# Patient Record
Sex: MALE | Race: WHITE
[De-identification: names, ages, dates, MRNs, and addresses within clinical notes are randomized per-mention and may not be internally consistent; named-entity substitution may affect disease eponyms.]

---

## 2018-01-23 ENCOUNTER — HOSPITAL ENCOUNTER (INPATIENT)
Dept: HOSPITAL 17 - PHED | Age: 63
LOS: 2 days | Discharge: HOME | DRG: 193 | End: 2018-01-25
Attending: HOSPITALIST | Admitting: HOSPITALIST
Payer: OTHER GOVERNMENT

## 2018-01-23 VITALS
RESPIRATION RATE: 16 BRPM | SYSTOLIC BLOOD PRESSURE: 97 MMHG | HEART RATE: 72 BPM | OXYGEN SATURATION: 97 % | DIASTOLIC BLOOD PRESSURE: 54 MMHG

## 2018-01-23 VITALS
OXYGEN SATURATION: 93 % | HEART RATE: 80 BPM | SYSTOLIC BLOOD PRESSURE: 96 MMHG | RESPIRATION RATE: 16 BRPM | DIASTOLIC BLOOD PRESSURE: 61 MMHG

## 2018-01-23 VITALS — OXYGEN SATURATION: 92 %

## 2018-01-23 VITALS
DIASTOLIC BLOOD PRESSURE: 59 MMHG | TEMPERATURE: 97.7 F | HEART RATE: 86 BPM | RESPIRATION RATE: 18 BRPM | SYSTOLIC BLOOD PRESSURE: 126 MMHG | OXYGEN SATURATION: 85 %

## 2018-01-23 VITALS
SYSTOLIC BLOOD PRESSURE: 117 MMHG | DIASTOLIC BLOOD PRESSURE: 64 MMHG | RESPIRATION RATE: 16 BRPM | OXYGEN SATURATION: 94 % | HEART RATE: 78 BPM

## 2018-01-23 VITALS
OXYGEN SATURATION: 94 % | HEART RATE: 76 BPM | SYSTOLIC BLOOD PRESSURE: 110 MMHG | DIASTOLIC BLOOD PRESSURE: 63 MMHG | RESPIRATION RATE: 16 BRPM

## 2018-01-23 VITALS
RESPIRATION RATE: 16 BRPM | DIASTOLIC BLOOD PRESSURE: 77 MMHG | OXYGEN SATURATION: 94 % | HEART RATE: 70 BPM | SYSTOLIC BLOOD PRESSURE: 119 MMHG

## 2018-01-23 VITALS
SYSTOLIC BLOOD PRESSURE: 128 MMHG | HEART RATE: 58 BPM | RESPIRATION RATE: 16 BRPM | OXYGEN SATURATION: 84 % | DIASTOLIC BLOOD PRESSURE: 62 MMHG

## 2018-01-23 VITALS — HEIGHT: 72 IN | WEIGHT: 214.73 LBS | BODY MASS INDEX: 29.08 KG/M2

## 2018-01-23 VITALS
OXYGEN SATURATION: 96 % | DIASTOLIC BLOOD PRESSURE: 68 MMHG | RESPIRATION RATE: 20 BRPM | HEART RATE: 82 BPM | SYSTOLIC BLOOD PRESSURE: 142 MMHG

## 2018-01-23 VITALS — RESPIRATION RATE: 19 BRPM | OXYGEN SATURATION: 93 %

## 2018-01-23 VITALS
HEART RATE: 78 BPM | OXYGEN SATURATION: 92 % | DIASTOLIC BLOOD PRESSURE: 63 MMHG | SYSTOLIC BLOOD PRESSURE: 100 MMHG | RESPIRATION RATE: 16 BRPM

## 2018-01-23 VITALS
SYSTOLIC BLOOD PRESSURE: 105 MMHG | RESPIRATION RATE: 16 BRPM | OXYGEN SATURATION: 94 % | HEART RATE: 78 BPM | DIASTOLIC BLOOD PRESSURE: 72 MMHG

## 2018-01-23 DIAGNOSIS — I10: ICD-10-CM

## 2018-01-23 DIAGNOSIS — K21.9: ICD-10-CM

## 2018-01-23 DIAGNOSIS — J18.9: Primary | ICD-10-CM

## 2018-01-23 DIAGNOSIS — J44.1: ICD-10-CM

## 2018-01-23 DIAGNOSIS — Z87.891: ICD-10-CM

## 2018-01-23 DIAGNOSIS — J44.0: ICD-10-CM

## 2018-01-23 DIAGNOSIS — Z99.81: ICD-10-CM

## 2018-01-23 DIAGNOSIS — Z86.19: ICD-10-CM

## 2018-01-23 DIAGNOSIS — J96.20: ICD-10-CM

## 2018-01-23 DIAGNOSIS — I95.9: ICD-10-CM

## 2018-01-23 LAB
ALBUMIN SERPL-MCNC: 2.7 GM/DL (ref 3.4–5)
ALP SERPL-CCNC: 50 U/L (ref 45–117)
ALT SERPL-CCNC: 12 U/L (ref 12–78)
AST SERPL-CCNC: 10 U/L (ref 15–37)
BASOPHILS # BLD AUTO: 0.3 TH/MM3 (ref 0–0.2)
BASOPHILS NFR BLD: 1.8 % (ref 0–2)
BILIRUB SERPL-MCNC: 0.6 MG/DL (ref 0.2–1)
BUN SERPL-MCNC: 19 MG/DL (ref 7–18)
CALCIUM SERPL-MCNC: 8.6 MG/DL (ref 8.5–10.1)
CHLORIDE SERPL-SCNC: 91 MEQ/L (ref 98–107)
CREAT SERPL-MCNC: 1.9 MG/DL (ref 0.6–1.3)
EOSINOPHIL # BLD: 0.1 TH/MM3 (ref 0–0.4)
EOSINOPHIL NFR BLD: 0.3 % (ref 0–4)
ERYTHROCYTE [DISTWIDTH] IN BLOOD BY AUTOMATED COUNT: 12.8 % (ref 11.6–17.2)
GFR SERPLBLD BASED ON 1.73 SQ M-ARVRAT: 36 ML/MIN (ref 89–?)
GLUCOSE SERPL-MCNC: 128 MG/DL (ref 74–106)
HCO3 BLD-SCNC: 35.1 MEQ/L (ref 21–32)
HCT VFR BLD CALC: 47.1 % (ref 39–51)
HGB BLD-MCNC: 15.2 GM/DL (ref 13–17)
INR PPP: 1.2 RATIO
LYMPHOCYTES # BLD AUTO: 1.2 TH/MM3 (ref 1–4.8)
LYMPHOCYTES NFR BLD AUTO: 6.3 % (ref 9–44)
MAGNESIUM SERPL-MCNC: 1.9 MG/DL (ref 1.5–2.5)
MCH RBC QN AUTO: 30.7 PG (ref 27–34)
MCHC RBC AUTO-ENTMCNC: 32.3 % (ref 32–36)
MCV RBC AUTO: 95.1 FL (ref 80–100)
MONOCYTE #: 1.7 TH/MM3 (ref 0–0.9)
MONOCYTES NFR BLD: 9.2 % (ref 0–8)
NEUTROPHILS # BLD AUTO: 15.6 TH/MM3 (ref 1.8–7.7)
NEUTROPHILS NFR BLD AUTO: 82.4 % (ref 16–70)
PLATELET # BLD: 133 TH/MM3 (ref 150–450)
PMV BLD AUTO: 9.6 FL (ref 7–11)
PROT SERPL-MCNC: 7.2 GM/DL (ref 6.4–8.2)
PROTHROMBIN TIME: 12 SEC (ref 9.8–11.6)
RBC # BLD AUTO: 4.96 MIL/MM3 (ref 4.5–5.9)
SODIUM SERPL-SCNC: 131 MEQ/L (ref 136–145)
TROPONIN I SERPL-MCNC: (no result) NG/ML (ref 0.02–0.05)
WBC # BLD AUTO: 18.9 TH/MM3 (ref 4–11)

## 2018-01-23 PROCEDURE — 71045 X-RAY EXAM CHEST 1 VIEW: CPT

## 2018-01-23 PROCEDURE — 93005 ELECTROCARDIOGRAM TRACING: CPT

## 2018-01-23 PROCEDURE — 83735 ASSAY OF MAGNESIUM: CPT

## 2018-01-23 PROCEDURE — 80053 COMPREHEN METABOLIC PANEL: CPT

## 2018-01-23 PROCEDURE — 96366 THER/PROPH/DIAG IV INF ADDON: CPT

## 2018-01-23 PROCEDURE — 85025 COMPLETE CBC W/AUTO DIFF WBC: CPT

## 2018-01-23 PROCEDURE — 96372 THER/PROPH/DIAG INJ SC/IM: CPT

## 2018-01-23 PROCEDURE — 85610 PROTHROMBIN TIME: CPT

## 2018-01-23 PROCEDURE — 83880 ASSAY OF NATRIURETIC PEPTIDE: CPT

## 2018-01-23 PROCEDURE — 85730 THROMBOPLASTIN TIME PARTIAL: CPT

## 2018-01-23 PROCEDURE — 82550 ASSAY OF CK (CPK): CPT

## 2018-01-23 PROCEDURE — G0378 HOSPITAL OBSERVATION PER HR: HCPCS

## 2018-01-23 PROCEDURE — 94640 AIRWAY INHALATION TREATMENT: CPT

## 2018-01-23 PROCEDURE — 96361 HYDRATE IV INFUSION ADD-ON: CPT

## 2018-01-23 PROCEDURE — 84484 ASSAY OF TROPONIN QUANT: CPT

## 2018-01-23 PROCEDURE — 87804 INFLUENZA ASSAY W/OPTIC: CPT

## 2018-01-23 PROCEDURE — 80048 BASIC METABOLIC PNL TOTAL CA: CPT

## 2018-01-23 PROCEDURE — 83605 ASSAY OF LACTIC ACID: CPT

## 2018-01-23 PROCEDURE — 81001 URINALYSIS AUTO W/SCOPE: CPT

## 2018-01-23 PROCEDURE — 96365 THER/PROPH/DIAG IV INF INIT: CPT

## 2018-01-23 PROCEDURE — 96375 TX/PRO/DX INJ NEW DRUG ADDON: CPT

## 2018-01-23 PROCEDURE — 87040 BLOOD CULTURE FOR BACTERIA: CPT

## 2018-01-23 PROCEDURE — 96376 TX/PRO/DX INJ SAME DRUG ADON: CPT

## 2018-01-23 PROCEDURE — 94664 DEMO&/EVAL PT USE INHALER: CPT

## 2018-01-23 RX ADMIN — IPRATROPIUM BROMIDE AND ALBUTEROL SULFATE SCH AMPULE: .5; 3 SOLUTION RESPIRATORY (INHALATION) at 17:48

## 2018-01-23 RX ADMIN — METHYLPREDNISOLONE SODIUM SUCCINATE SCH MG: 40 INJECTION, POWDER, FOR SOLUTION INTRAMUSCULAR; INTRAVENOUS at 23:23

## 2018-01-23 RX ADMIN — IPRATROPIUM BROMIDE AND ALBUTEROL SULFATE SCH AMPULE: .5; 3 SOLUTION RESPIRATORY (INHALATION) at 17:51

## 2018-01-23 RX ADMIN — HEPARIN SODIUM SCH UNITS: 10000 INJECTION, SOLUTION INTRAVENOUS; SUBCUTANEOUS at 23:26

## 2018-01-23 NOTE — EKG
Date Performed: 01/23/2018       Time Performed: 17:46:57

 

PTAGE:      62 years

 

EKG:      Sinus rhythm 

 

 WITH OCCASIONAL VENTRICULAR PREMATURE COMPLEXES RIGHt AXIS POSSIBLE LEFT ATRIAL ENLARGEMENT POSSIBLE
 RIGHT VENTRICULAR HYPERTROPHY ABNORMAL ECG

 

PREVIOUS TRACING       : 10/20/2016 14.08 Since previous tracing rate faster

 

DOCTOR:   Isael Lerma  Interpretating Date/Time  01/23/2018 21:08:56

## 2018-01-23 NOTE — PD
Physical Exam


Date Seen by Provider:  Jan 23, 2018


Narrative


This patient's care was assumed from Dr. ruiz at 7 PM.  He had been sent to us 

from a pulmonologist office because of hypotension.  This patient has a history 

of COPD and is on oxygen at 3-1/2 L at home.  He reports the onset of flulike 

symptoms about 2 days ago.  He went to the pulmonologist's office today for 

treatment of his flulike symptoms.  He was found to be hypotensive and was sent 

to us for further evaluation.  He has a history of hypertension but states that 

his blood pressure is controlled with medications.  He believes that his normal 

systolic blood pressure is about 120 to 130.  He states that he has been 

feeling very dizzy for the last day or 2.  He denies any significant 

respiratory distress.





Medical history is significant for COPD, hypertension and hepatitis C.  He 

normally wears oxygen at home at 3-1/2 L.





Data


Data


Last Documented VS





Vital Signs








  Date Time  Temp Pulse Resp B/P (MAP) Pulse Ox O2 Delivery O2 Flow Rate FiO2


 


1/23/18 21:05  82 20 142/68 (92) 96 Nasal Cannula 3.50 


 


1/23/18 17:21 97.7       








Orders





 Orders


Complete Blood Count With Diff (1/23/18 17:34)


Comprehensive Metabolic Panel (1/23/18 17:34)


B-Type Natriuretic Peptide (1/23/18 17:34)


Act Partial Throm Time (Ptt) (1/23/18 17:34)


Prothrombin Time / Inr (Pt) (1/23/18 17:34)


Magnesium (Mg) (1/23/18 17:34)


Ckmb (Isoenzyme) Profile (1/23/18 17:34)


Troponin I (1/23/18 17:34)


Urinalysis - C+S If Indicated (1/23/18 17:34)


Influenzae A/B Antigen (1/23/18 17:34)


Blood Culture (1/23/18 17:34)


Iv Access Insert/Monitor (1/23/18 17:34)


Electrocardiogram (1/23/18 17:34)


Ecg Monitoring (1/23/18 17:34)


Oximetry (1/23/18 17:34)


Oxygen Administration (1/23/18 17:34)


Chest, Single Ap (1/23/18 17:34)


Sodium Chloride 0.9% Flush (Ns Flush) (1/23/18 17:45)


Methylprednisolone So Succ Inj (Solumedr (1/23/18 17:45)


Albuterol-Ipratropium Neb (Duoneb Neb) (1/23/18 17:45)


Lactic Acid (1/23/18 17:34)


Aztreonam Inj (Azactam Inj) (1/23/18 18:15)


Levofloxacin 750 Mg Premix Inj (Levaquin (1/23/18 18:15)


Sodium Chlor 0.9% 1000 Ml Inj (Ns 1000 M (1/23/18 19:00)


Sodium Chlor 0.9% 1000 Ml Inj (Ns 1000 M (1/23/18 19:30)


Furosemide Inj (Lasix Inj) (1/23/18 21:15)


Sodium Chloride 0.9% Flush (Ns Flush) (1/23/18 22:30)


Sodium Chloride 0.9% Flush (Ns Flush) (1/24/18 09:00)


Levofloxacin 500 Mg Premix Inj (Levaquin (1/24/18 09:00)


Albuterol-Ipratropium Neb (Duoneb Neb) (1/23/18 22:30)


Methylprednisolone So Succ Inj (Solumedr (1/23/18 22:30)


Place In Observation (1/23/18 )


Vital Signs (Adult) Q4H (1/23/18 22:16)


Activity Oob With Assistance PRN (1/23/18 22:16)


Notify  Parameters (1/23/18 22:16)


Intake + Output Q8H (1/23/18 22:16)


^ Smoking Cessation Counseling (1/23/18 22:16)


Diet Heart Healthy (1/24/18 Breakfast)


Complete Blood Count With Diff (1/24/18 06:00)


Basic Metabolic Panel (Bmp) (1/24/18 06:00)


Heparin Inj (Heparin Inj) (1/23/18 22:30)





Labs





Laboratory Tests








Test


  1/23/18


18:00 1/23/18


19:11


 


White Blood Count 18.9 TH/MM3  


 


Red Blood Count 4.96 MIL/MM3  


 


Hemoglobin 15.2 GM/DL  


 


Hematocrit 47.1 %  


 


Mean Corpuscular Volume 95.1 FL  


 


Mean Corpuscular Hemoglobin 30.7 PG  


 


Mean Corpuscular Hemoglobin


Concent 32.3 % 


  


 


 


Red Cell Distribution Width 12.8 %  


 


Platelet Count 133 TH/MM3  


 


Mean Platelet Volume 9.6 FL  


 


Neutrophils (%) (Auto) 82.4 %  


 


Lymphocytes (%) (Auto) 6.3 %  


 


Monocytes (%) (Auto) 9.2 %  


 


Eosinophils (%) (Auto) 0.3 %  


 


Basophils (%) (Auto) 1.8 %  


 


Neutrophils # (Auto) 15.6 TH/MM3  


 


Lymphocytes # (Auto) 1.2 TH/MM3  


 


Monocytes # (Auto) 1.7 TH/MM3  


 


Eosinophils # (Auto) 0.1 TH/MM3  


 


Basophils # (Auto) 0.3 TH/MM3  


 


CBC Comment AUTO DIFF  


 


Differential Comment


  AUTO DIFF


CONFIRMED 


 


 


Prothrombin Time 12.0 SEC  


 


Prothromb Time International


Ratio 1.2 RATIO 


  


 


 


Activated Partial


Thromboplast Time 29.0 SEC 


  


 


 


B-Type Natriuretic Peptide 86 PG/ML  


 


Blood Urea Nitrogen  19 MG/DL 


 


Creatinine  1.90 MG/DL 


 


Random Glucose  128 MG/DL 


 


Total Protein  7.2 GM/DL 


 


Albumin  2.7 GM/DL 


 


Calcium Level  8.6 MG/DL 


 


Magnesium Level  1.9 MG/DL 


 


Alkaline Phosphatase  50 U/L 


 


Aspartate Amino Transf


(AST/SGOT) 


  10 U/L 


 


 


Alanine Aminotransferase


(ALT/SGPT) 


  12 U/L 


 


 


Total Bilirubin  0.6 MG/DL 


 


Sodium Level  131 MEQ/L 


 


Potassium Level  4.7 MEQ/L 


 


Chloride Level  91 MEQ/L 


 


Carbon Dioxide Level  35.1 MEQ/L 


 


Anion Gap  5 MEQ/L 


 


Estimat Glomerular Filtration


Rate 


  36 ML/MIN 


 


 


Lactic Acid Level  1.7 mmol/L 


 


Total Creatine Kinase  22 U/L 


 


Troponin I


  


  LESS THAN 0.02


NG/ML











[facility]


Supervised Visit with JEANNIE:  No


Narrative Course


This patient looks good.  He does not meet SIRS/sepsis criteria.  He does not 

have any respiratory distress with talking.  He did have some respiratory 

distress when he got up to go to the bathroom but he was off of oxygen at the 

time.








Vital Signs








  Date Time  Temp Pulse Resp B/P (MAP) Pulse Ox O2 Delivery O2 Flow Rate FiO2


 


1/23/18 21:05  82 20 142/68 (92) 96 Nasal Cannula 3.50 


 


1/23/18 21:00   16  87 Nasal Cannula 3.00 


 


1/23/18 20:30  76 16 110/63 (79) 94 Nasal Cannula 2.00 


 


1/23/18 20:00  78 16 100/63 (75) 92 Nasal Cannula 2.00 


 


1/23/18 19:45  80 16 96/61 (73) 93 Nasal Cannula 2.00 


 


1/23/18 19:05   16  93 Nasal Cannula 2.00 


 


1/23/18 17:50     92 Nasal Cannula 2.00 


 


1/23/18 17:40     91 Room Air 2.00 


 


1/23/18 17:37   19  93 Nasal Cannula 2.00 


 


1/23/18 17:30     92 Nasal Cannula 2.00 


 


1/23/18 17:21 97.7 86 18 126/59 (81) 85   








Last Impressions








Chest X-Ray 1/23/18 1734 Signed





Impressions: 





 Service Date/Time:  Tuesday, January 23, 2018 17:48 - CONCLUSION:  1. Interval 





 development of a predominantly interstitial process in the right hemithorax, 





 most severe in the upper lobe. Similar findings to a much lesser degree in the 





 left upper lobe. Findings could represent pneumonic infiltrates. 2. Heart size 





 remains normal.     Morales Katz MD 





CBC Diagram


1/23/18 18:00











BMP Diagram


1/23/18 19:11








Total Protein 7.2, Albumin 2.7 L, Calcium Level 8.6, Magnesium Level 1.9, 

Alkaline Phosphatase 50, Aspartate Amino Transf (AST/SGOT) 10 L, Alanine 

Aminotransferase (ALT/SGPT) 12, Total Bilirubin 0.6





Lactic acid is 1.7.  Troponin is less than 0.02.





The patient presented to us hypotensive.  He was fluid resuscitated.  He was 

watched here in the emergency department while being resuscitated.  His blood 

pressure is now stable.  I feel that he is stable to go to a regular bed other 

than the ICU.





He has been treated for hospital-acquired pneumonia with aztreonam and 

Levaquin.  He is penicillin allergic.


Critical Care Narrative


Aggregate critical care time was 45 minutes. Time to perform other separately 

billable procedures was not  


included in the critical care time. My time did not include minutes spent 

treating any other patients simultaneously or on  


activities that did not directly contribute to the patient's treatment.  





The services I provided to this patient were to treat and/or prevent clinically 

significant deterioration due to hypotension, pneumonia, rule out sepsis





I provided critical care services requiring my management, as noted below:


Chart data review, documentation time, medication orders and management, vital 

sign assessments/reviewing monitor data,  


ordering and reviewing lab tests, ordering and interpreting/reviewing x-rays 

and diagnostic studies, care of the patient  


and discussion of the patient with the admitting physicians


Sepsis Criteria


SIRS Criteria (2 or more):  WBC > 33522, < 4000 or > 10% bands


Sepsis Criteria (SIRS+source):  Infect source susp/known





Physician Communication


Physician Communication


Dr. Saini





Diagnosis





 Primary Impression:  


 Pneumonia


 Qualified Codes:  J18.9 - Pneumonia, unspecified organism


 Additional Impression:  


 Hypotension


 Qualified Codes:  I95.9 - Hypotension, unspecified





Admitting Information


Admitting Physician Requests:  Admit


Condition:  Stable











Angela Le MD Jan 23, 2018 21:45

## 2018-01-23 NOTE — RADRPT
EXAM DATE/TIME:  01/23/2018 17:48 

 

[facility] COMPARISON:     

CHEST SINGLE AP, October 20, 2016, 14:24.

 

                     

INDICATIONS :     

Low blood pressure.  Patient sent to the emergency room from primary doctor's office.

                     

 

MEDICAL HISTORY :     

Chronic obstructive pulmonary disease.  Hypertension  Emphysema.   Hep C. GERD.   

 

SURGICAL HISTORY :     

None.   

 

ENCOUNTER:     

Initial                                        

 

ACUITY:     

1 day      

 

PAIN SCORE:     

0/10

 

LOCATION:     

Bilateral chest 

 

FINDINGS:     

A single view of the chest demonstrates interval development of a rather diffuse, predominantly inter
stitial process in the right hemithorax, most severe in the right upper lung. Minimal interstitial ch
anges on the left apex very left base is completely clear. Heart size is normal. Osseous structures a
re intact.

 

CONCLUSION:     

1. Interval development of a predominantly interstitial process in the right hemithorax, most severe 
in the upper lobe. Similar findings to a much lesser degree in the left upper lobe. Findings could re
present pneumonic infiltrates.

2. Heart size remains normal.

 

 

 

 Morales Katz MD on January 23, 2018 at 18:01           

Board Certified Radiologist.

 This report was verified electronically.

## 2018-01-23 NOTE — PD
HPI


Chief Complaint:  General Weakness


Time Seen by Provider:  17:33


Travel History


International Travel<30 days:  No


Contact w/Intl Traveler<30days:  No


Traveled to known affect area:  No





History of Present Illness


HPI


62-year-old male states that he was at his pulmonologist's partner Dr. Brian 

and his blood pressure was low and so they advised for him to come to the 

emergency room.  He states that he lately has been using oxygen most of the 

time about 3 or so liters at home.  He states the past couple of days he felt 

like he had the flu with cough and congestion and shortness of breath.  He 

denies any other concurrent complaints at this time.  Patient is a poor 

historian and his wife helps supplement history.  He is currently not on 

antibiotics but is on steroids.





PFSH


Past Medical History


Arthritis:  No


Asthma:  No


Anxiety:  No


Depression:  No


Heart Rhythm Problems:  No


Cancer:  No


Cardiovascular Problems:  Yes


High Cholesterol:  No


Chemotherapy:  No


Chest Pain:  Yes


Congestive Heart Failure:  No


COPD:  Yes


Cerebrovascular Accident:  No


Diabetes:  No


Endocrine:  No


GERD:  Yes


Genitourinary:  No


Hepatitis:  Yes (HEP C)


Hiatal Hernia:  No


Hypertension:  Yes


Immune Disorder:  No


Kidney Stones:  No


Musculoskeletal:  No


Neurologic:  No


Psychiatric:  No


Reproductive:  No


Respiratory:  Yes


Migraines:  No


Radiation Therapy:  No


Renal Failure:  No


Seizures:  No


Sickle Cell Disease:  No


Sleep Apnea:  No


Thyroid Disease:  No


Ulcer:  No


Influenza Vaccination:  No





Past Surgical History


Abdominal Surgery:  No


AICD:  No


Arteriovenous Shunt:  No


Cardiac Surgery:  No


Ear Surgery:  No


Endocrine Surgery:  No


Eye Surgery:  Yes (CATARACTS BILAT HARVEY)


Genitourinary Surgery:  Yes


Gynecologic Surgery:  No


Insulin Pump:  No


Joint Replacement:  No


Oral Surgery:  No


Pacemaker:  No


Thoracic Surgery:  Yes (lung biopsy )


Other Surgery:  Yes (HEMORRHOIDS)





Social History


Alcohol Use:  Yes (COUPLE OF BEERS DAILY )


Tobacco Use:  No (QUIT 2007)


Substance Use:  No





Allergies-Medications


(Allergen,Severity, Reaction):  


Coded Allergies:  


     penicillin G (Unverified  Allergy, Unknown, "almost killed me", 1/23/18)


Reported Meds & Prescriptions





Reported Meds & Active Scripts


Active


Reported


Lisinopril 10 Mg Tab 10 Mg PO DAILY


Propranolol (Propranolol HCl) 40 Mg Tab 40 Mg PO DAILY


Spironolactone 50 Mg Tab 50 Mg PO DAILY








Review of Systems


Except as stated in HPI:  all other systems reviewed are Neg





Physical Exam


Narrative


GENERAL: Well-nourished, well-developed patient.


SKIN: Warm and dry.


HEAD: Normocephalic and atraumatic.


EYES: No injection or drainage. 


ENT: No nasal drainage noted. 


NECK: Supple, trachea midline.


CARDIOVASCULAR: Regular rate and rhythm 


RESPIRATORY: Decreased aeration with expiratory wheezing bilaterally. No 

accessory muscle use.


GASTROINTESTINAL: Abdomen soft, non-tender, nondistended. 


EXTREMITIES: No edema.


NEUROLOGICAL: Awake and alert. moves all extremities and sensory grossly within 

normal limits. Normal speech.





Data


Data


Last Documented VS





Orders





 Orders


Complete Blood Count With Diff (1/23/18 17:34)


Comprehensive Metabolic Panel (1/23/18 17:34)


B-Type Natriuretic Peptide (1/23/18 17:34)


Act Partial Throm Time (Ptt) (1/23/18 17:34)


Prothrombin Time / Inr (Pt) (1/23/18 17:34)


Magnesium (Mg) (1/23/18 17:34)


Ckmb (Isoenzyme) Profile (1/23/18 17:34)


Troponin I (1/23/18 17:34)


Urinalysis - C+S If Indicated (1/23/18 17:34)


Influenzae A/B Antigen (1/23/18 17:34)


Blood Culture (1/23/18 17:34)


Iv Access Insert/Monitor (1/23/18 17:34)


Electrocardiogram (1/23/18 17:34)


Ecg Monitoring (1/23/18 17:34)


Oximetry (1/23/18 17:34)


Oxygen Administration (1/23/18 17:34)


Chest, Single Ap (1/23/18 17:34)


Sodium Chloride 0.9% Flush (Ns Flush) (1/23/18 17:45)


Methylprednisolone So Succ Inj (Solumedr (1/23/18 17:45)


Albuterol-Ipratropium Neb (Duoneb Neb) (1/23/18 17:45)


Lactic Acid (1/23/18 17:34)


Aztreonam Inj (Azactam Inj) (1/23/18 18:15)


Levofloxacin 750 Mg Premix Inj (Levaquin (1/23/18 18:15)


Sodium Chlor 0.9% 1000 Ml Inj (Ns 1000 M (1/23/18 19:00)


Sodium Chlor 0.9% 1000 Ml Inj (Ns 1000 M (1/23/18 19:30)


Furosemide Inj (Lasix Inj) (1/23/18 21:15)


Sodium Chloride 0.9% Flush (Ns Flush) (1/23/18 22:30)


Sodium Chloride 0.9% Flush (Ns Flush) (1/24/18 09:00)


Levofloxacin 500 Mg Premix Inj (Levaquin (1/24/18 21:00)


Albuterol-Ipratropium Neb (Duoneb Neb) (1/23/18 22:30)


Methylprednisolone So Succ Inj (Solumedr (1/23/18 22:30)


Place In Observation (1/23/18 )


Vital Signs (Adult) Q4H (1/23/18 22:16)


Activity Oob With Assistance PRN (1/23/18 22:16)


Notify  Parameters (1/23/18 22:16)


Intake + Output Q8H (1/23/18 22:16)


^ Smoking Cessation Counseling (1/23/18 22:16)


Diet Heart Healthy (1/24/18 Breakfast)


Complete Blood Count With Diff (1/24/18 06:00)


Basic Metabolic Panel (Bmp) (1/24/18 06:00)


Heparin Inj (Heparin Inj) (1/23/18 22:30)


Admit Order (Ed Use Only) (1/23/18 )


Vital Signs (Adult) Q4H (1/23/18 22:20)





Labs





Laboratory Tests








Test


  1/23/18


18:00 1/23/18


19:11


 


White Blood Count 18.9 TH/MM3  


 


Red Blood Count 4.96 MIL/MM3  


 


Hemoglobin 15.2 GM/DL  


 


Hematocrit 47.1 %  


 


Mean Corpuscular Volume 95.1 FL  


 


Mean Corpuscular Hemoglobin 30.7 PG  


 


Mean Corpuscular Hemoglobin


Concent 32.3 % 


  


 


 


Red Cell Distribution Width 12.8 %  


 


Platelet Count 133 TH/MM3  


 


Mean Platelet Volume 9.6 FL  


 


Neutrophils (%) (Auto) 82.4 %  


 


Lymphocytes (%) (Auto) 6.3 %  


 


Monocytes (%) (Auto) 9.2 %  


 


Eosinophils (%) (Auto) 0.3 %  


 


Basophils (%) (Auto) 1.8 %  


 


Neutrophils # (Auto) 15.6 TH/MM3  


 


Lymphocytes # (Auto) 1.2 TH/MM3  


 


Monocytes # (Auto) 1.7 TH/MM3  


 


Eosinophils # (Auto) 0.1 TH/MM3  


 


Basophils # (Auto) 0.3 TH/MM3  


 


CBC Comment AUTO DIFF  


 


Differential Comment


  AUTO DIFF


CONFIRMED 


 


 


Prothrombin Time 12.0 SEC  


 


Prothromb Time International


Ratio 1.2 RATIO 


  


 


 


Activated Partial


Thromboplast Time 29.0 SEC 


  


 


 


B-Type Natriuretic Peptide 86 PG/ML  


 


Blood Urea Nitrogen  19 MG/DL 


 


Creatinine  1.90 MG/DL 


 


Random Glucose  128 MG/DL 


 


Total Protein  7.2 GM/DL 


 


Albumin  2.7 GM/DL 


 


Calcium Level  8.6 MG/DL 


 


Magnesium Level  1.9 MG/DL 


 


Alkaline Phosphatase  50 U/L 


 


Aspartate Amino Transf


(AST/SGOT) 


  10 U/L 


 


 


Alanine Aminotransferase


(ALT/SGPT) 


  12 U/L 


 


 


Total Bilirubin  0.6 MG/DL 


 


Sodium Level  131 MEQ/L 


 


Potassium Level  4.7 MEQ/L 


 


Chloride Level  91 MEQ/L 


 


Carbon Dioxide Level  35.1 MEQ/L 


 


Anion Gap  5 MEQ/L 


 


Estimat Glomerular Filtration


Rate 


  36 ML/MIN 


 


 


Lactic Acid Level  1.7 mmol/L 


 


Total Creatine Kinase  22 U/L 


 


Troponin I


  


  LESS THAN 0.02


NG/ML











MDM


Medical Decision Making


Medical Screen Exam Complete:  Yes


Emergency Medical Condition:  Yes


Medical Record Reviewed:  Yes (pmh confirmed)


Differential Diagnosis


Sepsis, pneumonia, URI, COPD exacerbation, pneumothorax


Narrative Course


Will check blood work, chest x-ray, influenza and dose with DuoNeb's and Solu-

Medrol and reevaluate





Physician Communication


Physician Communication


dr junior to follow workup and admit


Scripts


Fluticasone-Salmeterol Inh (Advair Diskus Inh) 250-50 Mcg/Blist Aer


1 PUFF INH BID for Shortness of Breath for 30 Days, #1 INHALER 0 Refills


   Rinse mouth after use.


   Prov: Arlyn Moore ARNP         1/25/18 


Clonidine (Clonidine) 0.1 Mg Tab


0.1 MG PO HS for Blood Pressure Management for 30 Days, #30 TAB 0 Refills


   Prov: Arlyn Moore ARNP         1/25/18 


Doxycycline Hyclate (Doxycycline Hyclate) 100 Mg Cap


100 MG PO BID for Infection for 5 Days, #10 CAP 0 Refills


   Prov: Arlyn Moore ARNP         1/25/18











Yola Avalos MD Jan 23, 2018 17:59

## 2018-01-24 VITALS
OXYGEN SATURATION: 91 % | RESPIRATION RATE: 20 BRPM | TEMPERATURE: 96 F | DIASTOLIC BLOOD PRESSURE: 78 MMHG | HEART RATE: 76 BPM | SYSTOLIC BLOOD PRESSURE: 120 MMHG

## 2018-01-24 VITALS
SYSTOLIC BLOOD PRESSURE: 120 MMHG | DIASTOLIC BLOOD PRESSURE: 77 MMHG | TEMPERATURE: 96 F | OXYGEN SATURATION: 93 % | HEART RATE: 79 BPM | RESPIRATION RATE: 20 BRPM

## 2018-01-24 VITALS
RESPIRATION RATE: 20 BRPM | SYSTOLIC BLOOD PRESSURE: 142 MMHG | DIASTOLIC BLOOD PRESSURE: 85 MMHG | TEMPERATURE: 97.2 F | OXYGEN SATURATION: 93 % | HEART RATE: 90 BPM

## 2018-01-24 VITALS
DIASTOLIC BLOOD PRESSURE: 84 MMHG | SYSTOLIC BLOOD PRESSURE: 137 MMHG | OXYGEN SATURATION: 92 % | RESPIRATION RATE: 20 BRPM | HEART RATE: 86 BPM | TEMPERATURE: 96.5 F

## 2018-01-24 VITALS — OXYGEN SATURATION: 96 %

## 2018-01-24 VITALS
SYSTOLIC BLOOD PRESSURE: 159 MMHG | TEMPERATURE: 97.1 F | HEART RATE: 96 BPM | RESPIRATION RATE: 22 BRPM | DIASTOLIC BLOOD PRESSURE: 81 MMHG | OXYGEN SATURATION: 94 %

## 2018-01-24 VITALS
TEMPERATURE: 97.8 F | SYSTOLIC BLOOD PRESSURE: 143 MMHG | OXYGEN SATURATION: 92 % | DIASTOLIC BLOOD PRESSURE: 85 MMHG | HEART RATE: 85 BPM | RESPIRATION RATE: 20 BRPM

## 2018-01-24 VITALS — OXYGEN SATURATION: 93 %

## 2018-01-24 VITALS — OXYGEN SATURATION: 92 %

## 2018-01-24 LAB
BASOPHILS # BLD AUTO: 0 TH/MM3 (ref 0–0.2)
BASOPHILS NFR BLD: 0 % (ref 0–2)
BUN SERPL-MCNC: 18 MG/DL (ref 7–18)
CALCIUM SERPL-MCNC: 8.3 MG/DL (ref 8.5–10.1)
CHLORIDE SERPL-SCNC: 99 MEQ/L (ref 98–107)
COLOR UR: YELLOW
CREAT SERPL-MCNC: 0.94 MG/DL (ref 0.6–1.3)
EOSINOPHIL # BLD: 0 TH/MM3 (ref 0–0.4)
EOSINOPHIL NFR BLD: 0 % (ref 0–4)
ERYTHROCYTE [DISTWIDTH] IN BLOOD BY AUTOMATED COUNT: 12.3 % (ref 11.6–17.2)
GFR SERPLBLD BASED ON 1.73 SQ M-ARVRAT: 81 ML/MIN (ref 89–?)
GLUCOSE SERPL-MCNC: 148 MG/DL (ref 74–106)
GLUCOSE UR STRIP-MCNC: (no result) MG/DL
HCO3 BLD-SCNC: 35.2 MEQ/L (ref 21–32)
HCT VFR BLD CALC: 45.7 % (ref 39–51)
HGB BLD-MCNC: 14.2 GM/DL (ref 13–17)
HGB UR QL STRIP: (no result)
KETONES UR STRIP-MCNC: (no result) MG/DL
LEUKOCYTE ESTERASE UR QL STRIP: (no result) /HPF (ref 0–5)
LYMPHOCYTES # BLD AUTO: 0.7 TH/MM3 (ref 1–4.8)
LYMPHOCYTES NFR BLD AUTO: 5.1 % (ref 9–44)
MCH RBC QN AUTO: 29.3 PG (ref 27–34)
MCHC RBC AUTO-ENTMCNC: 31.2 % (ref 32–36)
MCV RBC AUTO: 93.9 FL (ref 80–100)
MONOCYTE #: 0.4 TH/MM3 (ref 0–0.9)
MONOCYTES NFR BLD: 2.8 % (ref 0–8)
MUCOUS THREADS #/AREA URNS LPF: (no result) /LPF
NEUTROPHILS # BLD AUTO: 12.8 TH/MM3 (ref 1.8–7.7)
NEUTROPHILS NFR BLD AUTO: 92.1 % (ref 16–70)
NITRITE UR QL STRIP: (no result)
PLATELET # BLD: 124 TH/MM3 (ref 150–450)
PMV BLD AUTO: 8.5 FL (ref 7–11)
RBC # BLD AUTO: 4.86 MIL/MM3 (ref 4.5–5.9)
RBC #/AREA URNS HPF: (no result) /HPF (ref 0–3)
SODIUM SERPL-SCNC: 136 MEQ/L (ref 136–145)
SP GR UR STRIP: 1.02 (ref 1–1.03)
SQUAMOUS #/AREA URNS HPF: (no result) /HPF (ref 0–5)
URINE LEUKOCYTE ESTERASE: (no result)
WBC # BLD AUTO: 13.9 TH/MM3 (ref 4–11)

## 2018-01-24 RX ADMIN — AZTREONAM SCH MLS/HR: 2 INJECTION, POWDER, LYOPHILIZED, FOR SOLUTION INTRAMUSCULAR; INTRAVENOUS at 04:37

## 2018-01-24 RX ADMIN — AZTREONAM SCH MLS/HR: 2 INJECTION, POWDER, LYOPHILIZED, FOR SOLUTION INTRAMUSCULAR; INTRAVENOUS at 12:23

## 2018-01-24 RX ADMIN — IPRATROPIUM BROMIDE AND ALBUTEROL SULFATE PRN AMPULE: .5; 3 SOLUTION RESPIRATORY (INHALATION) at 14:07

## 2018-01-24 RX ADMIN — METHYLPREDNISOLONE SODIUM SUCCINATE SCH MG: 40 INJECTION, POWDER, FOR SOLUTION INTRAMUSCULAR; INTRAVENOUS at 22:29

## 2018-01-24 RX ADMIN — IPRATROPIUM BROMIDE AND ALBUTEROL SULFATE PRN AMPULE: .5; 3 SOLUTION RESPIRATORY (INHALATION) at 07:38

## 2018-01-24 RX ADMIN — HEPARIN SODIUM SCH UNITS: 10000 INJECTION, SOLUTION INTRAVENOUS; SUBCUTANEOUS at 09:49

## 2018-01-24 RX ADMIN — HEPARIN SODIUM SCH UNITS: 10000 INJECTION, SOLUTION INTRAVENOUS; SUBCUTANEOUS at 22:20

## 2018-01-24 RX ADMIN — HEPARIN SODIUM SCH UNITS: 10000 INJECTION, SOLUTION INTRAVENOUS; SUBCUTANEOUS at 14:30

## 2018-01-24 RX ADMIN — METHYLPREDNISOLONE SODIUM SUCCINATE SCH MG: 40 INJECTION, POWDER, FOR SOLUTION INTRAMUSCULAR; INTRAVENOUS at 12:20

## 2018-01-24 RX ADMIN — AZTREONAM SCH MLS/HR: 2 INJECTION, POWDER, LYOPHILIZED, FOR SOLUTION INTRAMUSCULAR; INTRAVENOUS at 20:58

## 2018-01-24 RX ADMIN — Medication SCH ML: at 20:58

## 2018-01-24 RX ADMIN — Medication SCH ML: at 09:44

## 2018-01-24 RX ADMIN — IPRATROPIUM BROMIDE AND ALBUTEROL SULFATE PRN AMPULE: .5; 3 SOLUTION RESPIRATORY (INHALATION) at 21:04

## 2018-01-24 NOTE — HHI.HP
__________________________________________________





HPI


Service


[facility]


Primary Care Physician


Unknown


Admission Diagnosis


Pneumonia


Diagnoses:  


Chief Complaint:  


Shortness of breath


Travel History


International Travel<30 Days:  No


Contact w/Intl Traveler <30 Da:  No


Traveled to Known Affected Are:  No


History of Present Illness


Written by Arlyn Moore, acting as scribe for Dr. Buitrago on 18 at 17:

48. 


This is a pleasant 62-year-old male patient with a known medical history of 

COPD and hypertension who presented to the ED with complaints of low blood 

pressure and shortness of breath.  Patient states that he was at his 

pulmonologist office when they took his BP and it read low and was advised to 

come to the ED for further evaluation.  Patient states that over the past few 

days he is becoming increasingly short of breath requiring increased use of his 

home oxygen.  He states he usually uses roughly 2 L NC at home continuously and 

has had to increase it to 3.5 L NC.  Patient denies any recent cough.  He does 

state that he has been feeling under the weather and with the flu and admits to 

recent fever or diaphoresis.  Does admit to recent steroid use denies any 

recent antibiotics.  Denies any chest pain, abdominal pain, nausea, vomiting or 

diarrhea.





Review of Systems


Constitutional:  DENIES: Fatigue, Fever, Chills


Eyes:  DENIES: Blurred vision, Diplopia


Respiratory:  COMPLAINS OF: Cough, Sputum production, Shortness of breath


Cardiovascular:  DENIES: Chest pain, Palpitations


Gastrointestinal:  DENIES: Abdominal pain, Black stools, Bloody stools, 

Constipation, Diarrhea, Nausea, Vomiting


Musculoskeletal:  DENIES: Joint pain


Integumentary:  DENIES: Abnormal pigmentation


Hematologic/lymphatic:  DENIES: Bruising


Immunologic/allergic:  DENIES: Eczema


Neurologic:  DENIES: Abnormal gait


Psychiatric:  COMPLAINS OF: Anxiety


Except as stated in HPI:  all other systems reviewed are Neg





Past Family Social History


Past Medical History


COPD


History of hepatitis C


Hypertension


Past Surgical History


Hemorrhoid banding


Colonoscopy


Lung biopsy


Bilateral cataracts


Ruptured testicle repair


Reported Medications


Active


Reported


Clonidine (Clonidine HCl) 0.2 Mg Tab 0.2 Mg PO HS


Lisinopril 10 Mg Tab 10 Mg PO DAILY


Propranolol (Propranolol HCl) 40 Mg Tab 40 Mg PO DAILY


Spironolactone 50 Mg Tab 50 Mg PO DAILY


Allergies:  


Coded Allergies:  


     penicillin G (Unverified  Allergy, Unknown, "almost killed me", 18)


Active Ordered Medications





Current Medications








 Medications


  (Trade)  Dose


 Ordered  Sig/Donta


 Route  Start Time


 Stop Time Status Last Admin


 


  (NS Flush)  2 ml  UNSCH  PRN


 IVF  18 17:45


     


 


 


  (NS Flush)  2 ml  UNSCH  PRN


 IV FLUSH  18 22:30


     


 


 


  (NS Flush)  2 ml  BID


 IV FLUSH  18 09:00


    18 09:44


 


 


 Levofloxacin/


 Dextrose  100 ml @ 


 100 mls/hr  Q24H


 IV  18 21:00


     


 


 


  (Duoneb Neb)  1 ampule  Q4HR NEB  PRN


 INH  18 22:30


    18 14:07


 


 


  (SoluMEDROL INJ)  40 mg  Q12H


 IV PUSH  18 22:30


    18 12:20


 


 


  (Heparin Inj)  5,000 units  Q8H


 SQ  18 22:30


    18 09:49


 


 


 Aztreonam 2000 mg/


 Sodium Chloride  100 ml @ 


 200 mls/hr  Q8H


 IV  18 04:00


    18 12:23


 


 


 Pharmacy Profile


 Note  0 ml @ 0


 mls/hr  UNSCH


 OTHER  18 17:30


     


 


 


 Vancomycin HCl


 2000 mg/Sodium


 Chloride  520 ml @ 


 250 mls/hr  ONCE  ONCE


 IV  18 18:00


 18 20:04   


 








Family History


Family history significant for diabetes and lung cancer.


Social History


 Patient denies any current tobacco use, states he quit over ten years ago.  

Denies any alcohol or illicit drug use.





Physical Exam


Vital Signs





Vital Signs








  Date Time  Temp Pulse Resp B/P (MAP) Pulse Ox O2 Delivery O2 Flow Rate FiO2


 


18 12:00 97.2 90 20 142/85 (104) 93   


 


18 08:00 97.8 85 20 143/85 (104) 92   


 


18 07:39     96 Nasal Cannula 3.50 


 


18 04:00 96.0 76 20 120/78 (92) 91   


 


18 00:40     92 Nasal Cannula 3.50 


 


18 00:30 96.0 79 20 120/77 (91) 93   


 


18 00:30 96.0 79 20 120/77 (91) 93   


 


18 00:10        


 


18 23:30  70 16 119/77 (91) 94 Nasal Cannula 3.50 


 


18 23:00  72 16 97/54 (68) 97 Nasal Cannula 3.50 


 


18 22:30  58 16 128/62 (84) 84 Nasal Cannula 3.50 


 


18 22:00  78 16 105/72 (83) 94 Nasal Cannula 3.50 


 


18 21:30  78 16 117/64 (81) 94 Nasal Cannula 3.50 


 


18 21:05  82 20 142/68 (92) 96 Nasal Cannula 3.50 


 


18 21:00   16  87 Nasal Cannula 3.00 


 


18 20:30  76 16 110/63 (79) 94 Nasal Cannula 2.00 


 


18 20:00  78 16 100/63 (75) 92 Nasal Cannula 2.00 


 


18 19:45  80 16 96/61 (73) 93 Nasal Cannula 2.00 


 


18 19:05   16  93 Nasal Cannula 2.00 


 


18 17:50     92 Nasal Cannula 2.00 








Physical Exam


GENERAL: Well-nourished, well-developed patient in NAD.  On supplemental O2.


SKIN: Warm and dry. No rash.


HEAD:  Normocephalic. Atraumatic.


EYES: Pupils equal and round. No scleral icterus. No injection or drainage. 


ENT: No nasal bleeding or discharge.  Mucous membranes pink and moist.


NECK: Supple. Trachea midline.  


CARDIOVASCULAR: Regular rate and rhythm.  S1, S2 noted. No murmur appreciated. 


RESPIRATORY: No accessory muscle use.  Diffuse crackles at the right base.  

Breath sounds equal bilaterally.  


GASTROINTESTINAL: Abdomen soft, non-tender, nondistended. Normoactive bowel 

sounds x4.


MUSCULOSKELETAL: No obvious deformities. Extremities without clubbing, cyanosis

, or edema. 


NEUROLOGICAL: Awake and alert. No obvious cranial nerve deficits.  Motor 

grossly within normal limits. 5/5 muscle strength in bilateral upper and lower 

extremities.  Normal speech.


PSYCHIATRIC: Appropriate mood and affect; insight and judgment normal.


Laboratory





Laboratory Tests








Test


  18


18:00 18


19:11 18


03:30 18


06:20


 


White Blood Count 18.9    13.9 


 


Red Blood Count 4.96    4.86 


 


Hemoglobin 15.2    14.2 


 


Hematocrit 47.1    45.7 


 


Mean Corpuscular Volume 95.1    93.9 


 


Mean Corpuscular Hemoglobin 30.7    29.3 


 


Mean Corpuscular Hemoglobin


Concent 32.3 


  


  


  31.2 


 


 


Red Cell Distribution Width 12.8    12.3 


 


Platelet Count 133    124 


 


Mean Platelet Volume 9.6    8.5 


 


Neutrophils (%) (Auto) 82.4    92.1 


 


Lymphocytes (%) (Auto) 6.3    5.1 


 


Monocytes (%) (Auto) 9.2    2.8 


 


Eosinophils (%) (Auto) 0.3    0.0 


 


Basophils (%) (Auto) 1.8    0.0 


 


Neutrophils # (Auto) 15.6    12.8 


 


Lymphocytes # (Auto) 1.2    0.7 


 


Monocytes # (Auto) 1.7    0.4 


 


Eosinophils # (Auto) 0.1    0.0 


 


Basophils # (Auto) 0.3    0.0 


 


CBC Comment AUTO DIFF    DIFF FINAL 


 


Differential Comment


  AUTO DIFF


CONFIRMED 


  


   


 


 


Prothrombin Time 12.0    


 


Prothromb Time International


Ratio 1.2 


  


  


  


 


 


Activated Partial


Thromboplast Time 29.0 


  


  


  


 


 


B-Type Natriuretic Peptide 86    


 


Blood Urea Nitrogen  19   18 


 


Creatinine  1.90   0.94 


 


Random Glucose  128   148 


 


Total Protein  7.2   


 


Albumin  2.7   


 


Calcium Level  8.6   8.3 


 


Magnesium Level  1.9   


 


Alkaline Phosphatase  50   


 


Aspartate Amino Transf


(AST/SGOT) 


  10 


  


  


 


 


Alanine Aminotransferase


(ALT/SGPT) 


  12 


  


  


 


 


Total Bilirubin  0.6   


 


Sodium Level  131   136 


 


Potassium Level  4.7   4.5 


 


Chloride Level  91   99 


 


Carbon Dioxide Level  35.1   35.2 


 


Anion Gap  5   2 


 


Estimat Glomerular Filtration


Rate 


  36 


  


  81 


 


 


Lactic Acid Level  1.7   


 


Total Creatine Kinase  22   


 


Troponin I  LESS THAN 0.02   


 


Urine Color   YELLOW  


 


Urine Turbidity   CLEAR  


 


Urine pH   6.0  


 


Urine Specific Gravity   1.017  


 


Urine Protein   30  


 


Urine Glucose (UA)   NEG  


 


Urine Ketones   TRACE  


 


Urine Occult Blood   TRACE  


 


Urine Nitrite   NEG  


 


Urine Bilirubin   NEG  


 


Urine Leukocyte Esterase   NEG  


 


Urine RBC   0-3  


 


Urine WBC   0-2  


 


Urine Squamous Epithelial


Cells 


  


  0-5 


  


 


 


Urine Hyaline Casts   10-14  


 


Urine Fine Granular Casts   3-5  


 


Urine Mucus   MOD  


 


Microscopic Urinalysis Comment


  


  


  CULT NOT


INDICATED 


 














 Date/Time


Source Procedure


Growth Status


 


 


 18 18:05


Blood Peripheral Aerobic Blood Culture - Preliminary


NO GROWTH IN 1 DAY Resulted


 


 18 18:05


Blood Peripheral Anaerobic Blood Culture - Preliminary


NO GROWTH IN 1 DAY Resulted


 


 18 17:43


Nasal Aspirate Influenza Types A,B Antigen (LIZ) - Final


NEGATIVE FOR FLU A AND B ANTIGEN.... Complete








Result Diagram:  


18 0620                                                                   

             18 0620





Imaging





Last Impressions








Chest X-Ray 18 1734 Signed





Impressions: 





 Service Date/Time:  2018 17:48 - CONCLUSION:  1. Interval 





 development of a predominantly interstitial process in the right hemithorax, 





 most severe in the upper lobe. Similar findings to a much lesser degree in the 





 left upper lobe. Findings could represent pneumonic infiltrates. 2. Heart size 





 remains normal.     Morales Katz MD 











Septic Shock Reassessment


Septic shock perfusion:  reassessment completed





Caprini VTE Risk Assessment


Caprini VTE Risk Assessment:  No/Low Risk (score <= 1)


Caprini Risk Assessment Model











 Point Value = 1          Point Value = 2  Point Value = 3  Point Value = 5


 


Age 41-60


Minor surgery


BMI > 25 kg/m2


Swollen legs


Varicose veins


Pregnancy or postpartum


History of unexplained or recurrent


   spontaneous 


Oral contraceptives or hormone


   replacement


Sepsis (< 1 month)


Serious lung disease, including


   pneumonia (< 1 month)


Abnormal pulmonary function


Acute myocardial infarction


Congestive heart failure (< 1 month)


History of inflammatory bowel disease


Medical patient at bed rest Age 61-74


Arthroscopic surgery


Major open surgery (> 45 min)


Laparoscopic surgery (> 45 min)


Malignancy


Confined to bed (> 72 hours)


Immobilizing plaster cast


Central venous access Age >= 75


History of VTE


Family history of VTE


Factor V Leiden


Prothrombin 94843S


Lupus anticoagulant


Anticardiolipin antibodies


Elevated serum homocysteine


Heparin-induced thrombocytopenia


Other congenital or acquired


   thrombophilia Stroke (< 1 month)


Elective arthroplasty


Hip, pelvis, or leg fracture


Acute spinal cord injury (< 1 month)








Prophylaxis Regimen











   Total Risk


Factor Score Risk Level Prophylaxis Regimen


 


0-1      Low Early ambulation


 


2 Moderate Order ONE of the following:


*Sequential Compression Device (SCD)


*Heparin 5000 units SQ BID


 


3-4 Higher Order ONE of the following medications:


*Heparin 5000 units SQ TID


*Enoxaparin/Lovenox 40 mg SQ daily (WT < 150 kg, CrCl > 30 mL/min)


*Enoxaparin/Lovenox 30 mg SQ daily (WT < 150 kg, CrCl > 10-29 mL/min)


*Enoxaparin/Lovenox 30 mg SQ BID (WT < 150 kg, CrCl > 30 mL/min)


AND/OR


*Sequential Compression Device (SCD)


 


5 or more Highest Order ONE of the following medications:


*Heparin 5000 units SQ TID (Preferred with Epidurals)


*Enoxaparin/Lovenox 40 mg SQ daily (WT < 150 kg, CrCl > 30 mL/min)


*Enoxaparin/Lovenox 30 mg SQ daily (WT < 150 kg, CrCl > 10-29 mL/min)


*Enoxaparin/Lovenox 30 mg SQ BID (WT < 150 kg, CrCl > 30 mL/min)


AND


*Sequential Compression Device (SCD)











Assessment and Plan


Problem List:  


(1) COPD with exacerbation


ICD Code:  J44.1 - Chronic obstructive pulmonary disease with (acute) 

exacerbation


Plan:  


Patient placed on IV antibiotics including Levaquin and Azactam.  Also started 

on IV steroids scheduled.  Continue duo nebs as needed for shortness of breath.


Chest x-ray reviewed showing interval development of a predominantly 

interstitial process in the right hemithorax, could represent pneumonic 

infiltrates.


Blood cultures drawn and no growth to date.  Continue to follow.


Influenza negative.  UA negative.  Patient has been afebrile.


Leukocytosis noted on presentation, white blood cell 18.9, now decreased to 

13.9.  Repeat labs in a.m.


Continue supplemental O2 as needed.


Status post 2 L NS bolus in ED.





(2) Hypertension


ICD Code:  I10 - Essential (primary) hypertension


Plan:  Continue home medications.  Monitor BP trends.  Mildly elevated, 

systolic in the 150s.








DVT prophylaxis: SCDs.  Heparin.





Assessment and Plan


This note was transcribed by esther [].  I, Dr. Bong Quintana personally 

performed the history, physical exam, and medical decision making; and 

confirmed the accuracy of the information in the transcribed note.





Authenticated by Dr. Bong Quintana on 18 at 20:45.





Physician Certification


2 Midnight Certification Type:  Admission for Inpatient Services


Order for Inpatient Services


The services are ordered in accordance with Medicare regulations or non-

Medicare payer requirements, as applicable.  In the case of services not 

specified as inpatient-only, they are appropriately provided as inpatient 

services in accordance with the 2-midnight benchmark.


Estimated LOS (days):  3


3 days is the estimated time the patient will need to remain in the hospital, 

assuming treatment plan goals are met and no additional complications.


Post-Hospital Plan:  Home











Arlyn Moore 2018 17:55


Bong Zamorano MD 2018 20:45

## 2018-01-25 VITALS
DIASTOLIC BLOOD PRESSURE: 72 MMHG | HEART RATE: 76 BPM | RESPIRATION RATE: 20 BRPM | SYSTOLIC BLOOD PRESSURE: 137 MMHG | TEMPERATURE: 97.1 F | OXYGEN SATURATION: 92 %

## 2018-01-25 VITALS
DIASTOLIC BLOOD PRESSURE: 77 MMHG | OXYGEN SATURATION: 92 % | RESPIRATION RATE: 20 BRPM | HEART RATE: 93 BPM | TEMPERATURE: 96 F | SYSTOLIC BLOOD PRESSURE: 124 MMHG

## 2018-01-25 VITALS
TEMPERATURE: 96.9 F | OXYGEN SATURATION: 96 % | DIASTOLIC BLOOD PRESSURE: 68 MMHG | SYSTOLIC BLOOD PRESSURE: 121 MMHG | HEART RATE: 68 BPM | RESPIRATION RATE: 22 BRPM

## 2018-01-25 VITALS — OXYGEN SATURATION: 94 %

## 2018-01-25 LAB
BASOPHILS # BLD AUTO: 0 TH/MM3 (ref 0–0.2)
BASOPHILS NFR BLD: 0.1 % (ref 0–2)
BUN SERPL-MCNC: 18 MG/DL (ref 7–18)
CALCIUM SERPL-MCNC: 8.7 MG/DL (ref 8.5–10.1)
CHLORIDE SERPL-SCNC: 99 MEQ/L (ref 98–107)
CREAT SERPL-MCNC: 0.7 MG/DL (ref 0.6–1.3)
EOSINOPHIL # BLD: 0 TH/MM3 (ref 0–0.4)
EOSINOPHIL NFR BLD: 0 % (ref 0–4)
ERYTHROCYTE [DISTWIDTH] IN BLOOD BY AUTOMATED COUNT: 12.5 % (ref 11.6–17.2)
GFR SERPLBLD BASED ON 1.73 SQ M-ARVRAT: 114 ML/MIN (ref 89–?)
GLUCOSE SERPL-MCNC: 140 MG/DL (ref 74–106)
HCO3 BLD-SCNC: 33.7 MEQ/L (ref 21–32)
HCT VFR BLD CALC: 43.3 % (ref 39–51)
HGB BLD-MCNC: 13.8 GM/DL (ref 13–17)
LYMPHOCYTES # BLD AUTO: 0.8 TH/MM3 (ref 1–4.8)
LYMPHOCYTES NFR BLD AUTO: 5.3 % (ref 9–44)
MCH RBC QN AUTO: 29.7 PG (ref 27–34)
MCHC RBC AUTO-ENTMCNC: 31.9 % (ref 32–36)
MCV RBC AUTO: 93.2 FL (ref 80–100)
MONOCYTE #: 0.7 TH/MM3 (ref 0–0.9)
MONOCYTES NFR BLD: 4.8 % (ref 0–8)
NEUTROPHILS # BLD AUTO: 13.9 TH/MM3 (ref 1.8–7.7)
NEUTROPHILS NFR BLD AUTO: 89.8 % (ref 16–70)
PLATELET # BLD: 140 TH/MM3 (ref 150–450)
PMV BLD AUTO: 8.4 FL (ref 7–11)
RBC # BLD AUTO: 4.65 MIL/MM3 (ref 4.5–5.9)
SODIUM SERPL-SCNC: 138 MEQ/L (ref 136–145)
WBC # BLD AUTO: 15.4 TH/MM3 (ref 4–11)

## 2018-01-25 RX ADMIN — HEPARIN SODIUM SCH UNITS: 10000 INJECTION, SOLUTION INTRAVENOUS; SUBCUTANEOUS at 14:30

## 2018-01-25 RX ADMIN — METHYLPREDNISOLONE SODIUM SUCCINATE SCH MG: 40 INJECTION, POWDER, FOR SOLUTION INTRAMUSCULAR; INTRAVENOUS at 11:14

## 2018-01-25 RX ADMIN — AZTREONAM SCH MLS/HR: 2 INJECTION, POWDER, LYOPHILIZED, FOR SOLUTION INTRAMUSCULAR; INTRAVENOUS at 04:38

## 2018-01-25 RX ADMIN — HEPARIN SODIUM SCH UNITS: 10000 INJECTION, SOLUTION INTRAVENOUS; SUBCUTANEOUS at 05:51

## 2018-01-25 RX ADMIN — AZTREONAM SCH MLS/HR: 2 INJECTION, POWDER, LYOPHILIZED, FOR SOLUTION INTRAMUSCULAR; INTRAVENOUS at 12:05

## 2018-01-25 RX ADMIN — Medication SCH ML: at 08:49

## 2018-01-25 NOTE — HHI.DCPOC
Discharge Care Plan


Diagnosis:  


(1) Pneumonia


(2) Hypertension


(3) COPD with exacerbation








Your Health Problems Are: Shortness of Breath








Goals to Promote Your Health


* To prevent worsening of your condition and complications


* To maintain your health at the optimal level


Directions to Meet Your Goals


*** Take your medications as prescribed


*** Follow your dietary instruction


*** Follow activity as directed








*** Keep your appointments as scheduled


*** Take your immunizations and boosters as scheduled


*** If your symptoms worsen call your PCP, if no PCP go to Urgent Care Center 

or Emergency Room***


*** Smoking is Dangerous to Your Health. Avoid second hand smoke***


***Call the 24-hour hour crisis hotline for domestic abuse at 1-211.416.7662***











Arlyn Moore Jan 25, 2018 12:12

## 2018-01-25 NOTE — HHI.PR
Subjective


Remarks


Follow up hypotension COPD exacerbation.  Patient seen and examined, with at 

bedside.  Doing much better.  On 3 L nasal cannula, this is home baseline for 

him.  He denies any shortness of breath with exertion or rest.  Has been eating 

well.  Positive p.m.  Vital signs are stable.  Afebrile.





Objective


Vitals





Vital Signs








  Date Time  Temp Pulse Resp B/P (MAP) Pulse Ox O2 Delivery O2 Flow Rate FiO2


 


1/25/18 08:00 97.1 76 20 137/72 (93) 92   


 


1/25/18 07:00     92 Nasal Cannula 3.50 





      Humidified  


 


1/25/18 00:00 96.0 93 20 124/77 (93) 92   


 


1/24/18 21:05     93 Nasal Cannula 3.50 


 


1/24/18 20:00 96.5 86 20 137/84 (101) 92   


 


1/24/18 20:00     93 Nasal Cannula 3.50 





      Humidified  


 


1/24/18 16:00 97.1 96 22 159/81 (107) 94   














I/O      


 


 1/24/18 1/24/18 1/24/18 1/25/18 1/25/18 1/25/18





 07:00 15:00 23:00 07:00 15:00 23:00


 


Intake Total 0 ml 950 ml 700 ml 240 ml 515 ml 


 


Balance 0 ml 950 ml 700 ml 240 ml 515 ml 


 


      


 


Intake Oral 0 ml 850 ml  240 ml  


 


IV Total  100 ml 700 ml  515 ml 


 


# Voids 1 4  2  


 


# Bowel Movements 0   1  








Result Diagram:  


1/25/18 0600                                                                   

             1/25/18 0600





Imaging





Last Impressions








Chest X-Ray 1/23/18 0964 Signed





Impressions: 





 Service Date/Time:  Tuesday, January 23, 2018 17:48 - CONCLUSION:  1. Interval 





 development of a predominantly interstitial process in the right hemithorax, 





 most severe in the upper lobe. Similar findings to a much lesser degree in the 





 left upper lobe. Findings could represent pneumonic infiltrates. 2. Heart size 





 remains normal.     Morales Katz MD 








Objective Remarks


GENERAL: Well-nourished, well-developed patient in NAD.  On supplemental O2.  

In no apparent distress.


SKIN: Warm and dry. No rash.


HEAD:  Normocephalic. Atraumatic.


EYES: Pupils equal and round. No scleral icterus. No injection or drainage. 


ENT: No nasal bleeding or discharge.  Mucous membranes pink and moist.


NECK: Supple. Trachea midline.  


CARDIOVASCULAR: Regular rate and rhythm.  S1, S2 noted. No murmur appreciated. 


RESPIRATORY: No accessory muscle use. Clear to auscultation. Breath sounds 

equal bilaterally.  


GASTROINTESTINAL: Abdomen soft, non-tender, nondistended. Normoactive bowel 

sounds x4.


MUSCULOSKELETAL: No obvious deformities. Extremities without clubbing, cyanosis

, or edema. 


NEUROLOGICAL: Awake and alert. No obvious cranial nerve deficits.  Motor 

grossly within normal limits. 5/5 muscle strength in bilateral upper and lower 

extremities.  Normal speech.


PSYCHIATRIC: Appropriate mood and affect; insight and judgment normal.





A/P


Problem List:  


(1) COPD with exacerbation


ICD Code:  J44.1 - Chronic obstructive pulmonary disease with (acute) 

exacerbation


Plan:  


Patient placed on IV antibiotics including Levaquin and Azactam.  Also started 

on IV steroids scheduled.  Continue duo nebs as needed for shortness of breath.


Chest x-ray reviewed showing interval development of a predominantly 

interstitial process in the right hemithorax, could represent pneumonic 

infiltrates.


Blood cultures drawn and no growth to date. 


Influenza negative.  UA negative.  Patient has been afebrile.


Leukocytosis noted on presentation, likely due to steroids.


Continue supplemental O2 as needed.  At home baseline.


Status post 2 L NS bolus in ED.


Eating well. 


Patient will be discharged home later today on steroids and antibiotics by 

mouth.  Encouraged follow-up with PCP and pulmonologist upon discharge.


Patient is stable at this time and agreeable to the plan.





(2) Hypertension


ICD Code:  I10 - Essential (primary) hypertension


Plan:  Continue home medications.  Monitor BP trends.  Stable.








DVT prophylaxis: SCDs.  Heparin.





Discharge Planning


Will discharge home later this afternoon.











Arlyn Moore Jan 25, 2018 12:12

## 2018-01-26 NOTE — PQ
Physician Query Response Document

 

 

PATIENT:               UMU EGAN

ACCT #:                  [id number]

MRN:                       [id number]

:                       1955

ADMIT DATE:       2018 12:05 PM

DISCH DATE:        2018 4:26 PM

RESPONDING

PROVIDER #:        [email or web address]

 

 

QUERY TEXT:

 

Respiratory Failure Acuity and Type

 

** Based on your medical judgment, can you further clarify which, if any, of the following condition(
s) is/are responsible for these findings

 

Chronic respiratory failure-

Hypoxia

Other ***Specify*** ______________________________

Unable to determine (*please explain) ___________________________

 

 

 

The patient's Clinical Indicators include:

LONG TERM OXYGEN USE 31/2 L DAILY

COPD EXACERBATION F/U INFILTRATES

SATS 85 BP 96/61

OXYGEN TITRTED Q 2 HR

 

 

Query created by: Alina Arriaga on 2018 11:22 AM

 

RESPONSE TEXT:

 

Acute on chronic respiratory failure

 

 

 

 

 

 

 

Electronically signed by:  Bong Giles MD 2018 10:05 AM